# Patient Record
Sex: MALE | Race: WHITE | NOT HISPANIC OR LATINO | URBAN - METROPOLITAN AREA
[De-identification: names, ages, dates, MRNs, and addresses within clinical notes are randomized per-mention and may not be internally consistent; named-entity substitution may affect disease eponyms.]

---

## 2019-01-25 ENCOUNTER — TELEPHONE (OUTPATIENT)
Dept: FAMILY MEDICINE CLINIC | Facility: CLINIC | Age: 39
End: 2019-01-25

## 2019-01-25 NOTE — TELEPHONE ENCOUNTER
Karoline Nair came into the office asking if we took walk in appointments  I did advise pt we unfortunately we  do not do take walk in appointments we are family medicine and see our pt's  I asked if he was a current pt he did admit it has been awhile it has been over 6 years since pt was seen in our office he currently has no pcp  Pt admitted he has an appointment with GI but he has bad acid reflux chest tightness no trouble  breathing and no active chest pain  I informed I would ask we have no appointments available but I will check  I did speak to Andra Sorensen and advised he should go to [facility] [address] they take walk ins and or nay type of urgent care  Pt did state he will go somewhere else but thanked us for our time

## 2021-03-10 DIAGNOSIS — Z23 ENCOUNTER FOR IMMUNIZATION: ICD-10-CM
